# Patient Record
Sex: FEMALE | Race: WHITE
[De-identification: names, ages, dates, MRNs, and addresses within clinical notes are randomized per-mention and may not be internally consistent; named-entity substitution may affect disease eponyms.]

---

## 2022-09-03 ENCOUNTER — HOSPITAL ENCOUNTER (EMERGENCY)
Dept: HOSPITAL 47 - EC | Age: 67
Discharge: HOME | End: 2022-09-03
Payer: COMMERCIAL

## 2022-09-03 VITALS
DIASTOLIC BLOOD PRESSURE: 60 MMHG | HEART RATE: 82 BPM | RESPIRATION RATE: 18 BRPM | SYSTOLIC BLOOD PRESSURE: 116 MMHG | TEMPERATURE: 97.9 F
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
## 2022-09-03 DIAGNOSIS — Z87.891: ICD-10-CM

## 2022-09-03 DIAGNOSIS — S83.92XA: Primary | ICD-10-CM

## 2022-09-03 DIAGNOSIS — Z88.8: ICD-10-CM

## 2022-09-03 DIAGNOSIS — I10: ICD-10-CM

## 2022-09-03 PROCEDURE — 99283 EMERGENCY DEPT VISIT LOW MDM: CPT

## 2022-09-03 PROCEDURE — 73562 X-RAY EXAM OF KNEE 3: CPT

## 2022-09-03 NOTE — XR
EXAMINATION TYPE: XR knee complete LT

 

DATE OF EXAM: 9/3/2022 6:04 PM

 

INDICATION: 

Patient age:Female;  66 years old; 

Reason for study: fall;

 

COMPARISON: None.

 

TECHNIQUE: The Left knee(s) was examined in 3 projections. Frontal, lateral and oblique.

 

FINDINGS:   No fracture or dislocation. Asymmetric joint space narrowing is noted, most pronounced wi
thin the medial joint compartment. Enthesophyte formation is noted at the superior patellar pole. Mul
tiple phleboliths and vascular calcifications are seen within the posterior soft tissues. Mild disten
tion of the suprapatellar joint space.

 

IMPRESSION: 

1. No acute osseous pathology.

 

2. Mild tricompartmental osteoarthritic changes.

 

3. Mild distention of the suprapatellar joint space, this may represent a small effusion versus proje
ction.

## 2022-09-03 NOTE — ED
Fall HPI





- General


Chief Complaint: Fall


Stated Complaint: Fall-L knee injury


Time Seen by Provider: 22 18:44


Source: patient, family


Mode of arrival: ambulatory





- History of Present Illness


Initial Comments: 





This 66-year-old female presents with the complaint of a left knee injury.  She 

apparently does have a history of meniscal tear to her left knee and has been 

following up with orthopedics and was told at one point that she may need 

surgery on her left knee.  She then was having some problems with low back pain 

and was trying to resolve these his first.  Today, she had a mechanical fall 

when she tripped and fell and landed on her left knee.  The pain has 

significantly increased to her left knee.  She states that she is unable to bear

any weight.  She states that the pain is fairly severe.  She did take one and a 

half of her Norco 5/325 pills with some relief.  She denies any other injuries. 

There is no other complaints or modifying factors.  The pain is primarily at the

joint line left knee.





- Related Data


                                  Previous Rx's











 Medication  Instructions  Recorded


 


HYDROcodone/APAP 7.5-325MG [Norco 1 tab PO Q4H PRN 3 Days #18 tab 22





7.5-325]  











                                    Allergies











Allergy/AdvReac Type Severity Reaction Status Date / Time


 


ACE Inhibitors Allergy  Anaphylaxis Verified 22 17:49














Review of Systems


ROS Statement: 


Those systems with pertinent positive or pertinent negative responses have been 

documented in the HPI.





ROS Other: All systems not noted in ROS Statement are negative.





Past Medical History


Past Medical History: Hypertension


Past Surgical History: Bariatric Surgery,  Section, Hysterectomy


Smoking Status: Former smoker


Past Alcohol Use History: Occasional


Past Drug Use History: None Reported





General Exam





- General Exam Comments


Initial Comments: 





GENERAL: The patient is well nourished and well hydrated. 


VITAL SIGNS: Heart rate, blood pressure, respiratory rate reviewed as recorded 

in nurse's notes. 


EYES: Pupils are round and reactive. Extraocular movements are intact. No conjun

ctival / lid redness or swelling. 


ENT: No external evidence of injury, swelling, or ecchymosis. Airway is patent. 

Throat is clear. 


NECK: Nontender. No swelling or evidence of injury. No subcutaneous emphysema. 

Trachea is midline. No thyroid mass. 


HEART: Regular rate and rhythm. Good peripheral pulses. 


LUNGS/CHEST: Breath sounds clear and equal bilaterally. No rales, rhonchi, or 

wheezes. No ecchymosis, subcutaneous emphysema, or tenderness. 


ABDOMEN: Abdomen soft without tenderness. No palpable masses or organomegaly. No

peritoneal signs. No abdominal wall swelling or ecchymosis. 


EXTREMITIES: Tenderness is noted directly to the joint line left knee.  There 

may be some minimal swelling but no significant effusion.  There is pain with 

drawer testing, Carlos Enrique testing, and valgus testing.  The pain is fairly 

exquisite with Carlos Enrique.  There is some pain with extremes of range of motion as 

well.  Normal muscle tone and function. No thoracolumbar tenderness. 


NEUROLOGIC: Sensation is grossly intact. Cranial nerve exam reveals face is 

symmetrical, tongue is midline, speech is clear. 


SKIN: No abrasions or ecchymosis is noted. No induration or masses noted. 


PSYCHIATRIC: Alert and oriented. Appropriate behavior and judgment.








Limitations: physical limitation





Course


                                   Vital Signs











  22





  17:39


 


Temperature 97.9 F


 


Pulse Rate 82


 


Respiratory 18





Rate 


 


Blood Pressure 116/60


 


O2 Sat by Pulse 97





Oximetry 














Medical Decision Making





- Medical Decision Making





The patient was seen and examined.  All diagnostics are reviewed.  The patient 

had an x-ray of her left knee.  This did show significant arthritis but there is

no evidence of fracture.  Patient is placed in a knee immobilizer and dispensed 

crutches.  A long discussion was held in regards to further treatment options.  

She initially relates that she does not feel as though she can be discharged 

home as she lives upstairs.  Her daughter is present as well and daughter 

relates that she can come live at their house which is arranged.  Patient would 

prefer to attempt going to her daughter's house.  She feels as though she can 

utilize crutches.  She also will be prescribed a walker in case she is able to 

bear some degree of weight in the next several days.  Her Norco will be 

increased from the 5/325-7.5/325.  She apparently cannot tolerate nonsteroidal 

anti-inflammatory medications.  Return parameters are discussed.  Close follow-

up with her orthopedic doctor is recommended.  She states that she has an 

appointment in approximately one week but she is instructed to call on Tuesday 

a.m. to try to get a sooner appointment.  It is felt as though she likely does 

have a meniscal tear among other injuries to her left knee and may end up 

needing another MRI scan





Disposition


Clinical Impression: 


 Fall, Knee sprain, Meniscal injury





Disposition: HOME SELF-CARE


Condition: Good


Prescriptions: 


HYDROcodone/APAP 7.5-325MG [Norco 7.5-325] 1 tab PO Q4H PRN 3 Days #18 tab


 PRN Reason: Pain


Is patient prescribed a controlled substance at d/c from ED?: Yes


If prescribed controlled substance>3 days was MAPS reviewed?: Prescribed <3 Days


Referrals: 


Coy Mccoy MD [Primary Care Provider] - 1-2 days


Time of Disposition: 19:44